# Patient Record
Sex: FEMALE | Race: WHITE | NOT HISPANIC OR LATINO | Employment: FULL TIME | URBAN - METROPOLITAN AREA
[De-identification: names, ages, dates, MRNs, and addresses within clinical notes are randomized per-mention and may not be internally consistent; named-entity substitution may affect disease eponyms.]

---

## 2022-10-02 ENCOUNTER — OFFICE VISIT (OUTPATIENT)
Dept: URGENT CARE | Facility: URGENT CARE | Age: 52
End: 2022-10-02
Payer: COMMERCIAL

## 2022-10-02 ENCOUNTER — ANCILLARY PROCEDURE (OUTPATIENT)
Dept: GENERAL RADIOLOGY | Facility: CLINIC | Age: 52
End: 2022-10-02
Attending: PHYSICIAN ASSISTANT
Payer: COMMERCIAL

## 2022-10-02 VITALS
WEIGHT: 168.2 LBS | DIASTOLIC BLOOD PRESSURE: 90 MMHG | SYSTOLIC BLOOD PRESSURE: 162 MMHG | OXYGEN SATURATION: 98 % | HEART RATE: 69 BPM | TEMPERATURE: 97.9 F

## 2022-10-02 DIAGNOSIS — S93.602A SPRAIN OF LEFT FOOT, INITIAL ENCOUNTER: Primary | ICD-10-CM

## 2022-10-02 PROCEDURE — 99203 OFFICE O/P NEW LOW 30 MIN: CPT | Performed by: PHYSICIAN ASSISTANT

## 2022-10-02 PROCEDURE — 73630 X-RAY EXAM OF FOOT: CPT | Mod: TC | Performed by: RADIOLOGY

## 2022-10-02 NOTE — PROGRESS NOTES
SUBJECTIVE:   Karen E Hillerman is a 51 year old female presenting with a chief complaint of   Chief Complaint   Patient presents with     Urgent Care     Foot Pain     Pain on the top of left foot        She is a new patient of [facility].  Patient presents with complaints of left foot pain after stepping off a curb into a pot hole today during volunteering for a marathon.  No treatment rendered, came straight here.  No previous injury to foot. No other injury.  Is able to put weight on.  Limping.        Review of Systems    History reviewed. No pertinent past medical history.  Family History   Problem Relation Age of Onset     Hypertension Father      Cancer Brother      Diabetes Maternal Grandmother      Cerebrovascular Disease No family hx of      Thyroid Disease No family hx of      Glaucoma No family hx of      Macular Degeneration No family hx of      Current Outpatient Medications   Medication Sig Dispense Refill     AMOXICILLIN PO  (Patient not taking: Reported on 10/2/2022)       desogestrel-ethinyl estradiol (KARIVA) 0.15-0.02/0.01 MG (21/5) per tablet Take 1 tablet by mouth daily (Patient not taking: Reported on 10/2/2022)       Social History     Tobacco Use     Smoking status: Never Smoker     Smokeless tobacco: Never Used   Substance Use Topics     Alcohol use: Not on file       OBJECTIVE  BP (!) 162/90 (BP Location: Left arm, Patient Position: Sitting, Cuff Size: Adult Large)   Pulse 69   Temp 97.9  F (36.6  C) (Tympanic)   Wt 76.3 kg (168 lb 3.2 oz)   SpO2 98%     Physical Exam    Labs:  Results for orders placed or performed in visit on 10/02/22 (from the past 24 hour(s))   XR Foot Left G/E 3 Views    Narrative    EXAM: XR FOOT LEFT G/E 3 VIEWS  LOCATION: [facility]  DATE/TIME: 10/2/2022 9:52 AM    INDICATION:  Sprain of left foot, initial encounter  COMPARISON: None.      Impression    IMPRESSION: Normal joint spaces and alignment. No fracture.       X-Ray was  done, my findings are:     ASSESSMENT:      ICD-10-CM    1. Sprain of left foot, initial encounter  S93.602A XR Foot Left G/E 3 Views        Medical Decision Making:    Differential Diagnosis:  MS Injury Pain: sprain and fracture    Serious Comorbid Conditions:  Adult:  reviewed    PLAN:    Tylenol/motrin prn.  RICE.  Patient declined crutches/boot, etc.  Discussed expected course.      Followup:    If not improving or if condition worsens, follow up with your Primary Care Provider, If not improving or if conditions worsens over the next 12-24 hours, go to the Emergency Department    There are no Patient Instructions on file for this visit.

## 2022-12-24 ENCOUNTER — HEALTH MAINTENANCE LETTER (OUTPATIENT)
Age: 52
End: 2022-12-24

## 2023-11-25 ENCOUNTER — HEALTH MAINTENANCE LETTER (OUTPATIENT)
Age: 53
End: 2023-11-25

## 2024-02-03 ENCOUNTER — HEALTH MAINTENANCE LETTER (OUTPATIENT)
Age: 54
End: 2024-02-03

## 2024-11-09 ENCOUNTER — HEALTH MAINTENANCE LETTER (OUTPATIENT)
Age: 54
End: 2024-11-09

## 2025-03-02 ENCOUNTER — HEALTH MAINTENANCE LETTER (OUTPATIENT)
Age: 55
End: 2025-03-02